# Patient Record
Sex: FEMALE | Race: ASIAN | URBAN - METROPOLITAN AREA
[De-identification: names, ages, dates, MRNs, and addresses within clinical notes are randomized per-mention and may not be internally consistent; named-entity substitution may affect disease eponyms.]

---

## 2019-12-12 ENCOUNTER — OFFICE VISIT (OUTPATIENT)
Dept: FAMILY MEDICINE CLINIC | Facility: CLINIC | Age: 34
End: 2019-12-12
Payer: COMMERCIAL

## 2019-12-12 VITALS
HEART RATE: 104 BPM | HEIGHT: 59 IN | BODY MASS INDEX: 24.87 KG/M2 | TEMPERATURE: 99 F | SYSTOLIC BLOOD PRESSURE: 100 MMHG | RESPIRATION RATE: 20 BRPM | DIASTOLIC BLOOD PRESSURE: 58 MMHG | OXYGEN SATURATION: 98 % | WEIGHT: 123.38 LBS

## 2019-12-12 DIAGNOSIS — R05.9 COUGH: ICD-10-CM

## 2019-12-12 DIAGNOSIS — B34.9 ACUTE VIRAL SYNDROME: Primary | ICD-10-CM

## 2019-12-12 DIAGNOSIS — R11.2 NAUSEA AND VOMITING, INTRACTABILITY OF VOMITING NOT SPECIFIED, UNSPECIFIED VOMITING TYPE: ICD-10-CM

## 2019-12-12 DIAGNOSIS — J02.9 SORE THROAT: ICD-10-CM

## 2019-12-12 PROCEDURE — 99203 OFFICE O/P NEW LOW 30 MIN: CPT | Performed by: FAMILY MEDICINE

## 2019-12-12 PROCEDURE — 87880 STREP A ASSAY W/OPTIC: CPT | Performed by: FAMILY MEDICINE

## 2019-12-12 PROCEDURE — 87081 CULTURE SCREEN ONLY: CPT | Performed by: FAMILY MEDICINE

## 2019-12-12 RX ORDER — ONDANSETRON 4 MG/1
4 TABLET, ORALLY DISINTEGRATING ORAL EVERY 8 HOURS PRN
Qty: 10 TABLET | Refills: 0 | Status: SHIPPED | OUTPATIENT
Start: 2019-12-12

## 2019-12-12 RX ORDER — NORGESTIMATE AND ETHINYL ESTRADIOL
KIT
Refills: 2 | COMMUNITY
Start: 2019-10-31

## 2019-12-12 NOTE — PROGRESS NOTES
CHIEF COMPLAINT: Patient presents with:  URI: x 3 days   Headache: cough and vomiting   Fever        HPI:     Sirisha Bundy is a 37year old female presents for cold symptoms with fever and fatigue.     Medina Alpesh is a previously healthy 38 yo F p/w cold symptoms Musculoskeletal: Positive for myalgias. Neurological: Positive for headaches. Pertinent positives and negatives noted in the the HPI.     PHYSICAL EXAM:   /58 (BP Location: Right arm, Patient Position: Sitting, Cuff Size: adult)   Pulse 104 cough medications, cough drops, rest, fluids, tea with honey. If sx worsen or persist, advised to f-u with PCP. 2. Cough  Pt declined benzonatate Rx. Supportive care discussed.     3. Sore throat  Rapid Strep: neg, send for Cx.    - STREP A ASSAY W/OPT

## 2019-12-12 NOTE — PATIENT INSTRUCTIONS
As of October 6th 2014, the Drug Enforcement Agency [facility]) is reclassifying all hydrocodone combination medications from Schedule III to Schedule II. This includes medications such as Norco, Vicodin, Lortab, Zohydro, and Vicoprofen.      What this means for symptoms depend on the part of the body that the virus affects. If it settles in your nose, throat, and lungs, it may cause cough, sore throat, congestion, runny nose, headache, earache and other ear symptoms, or shortness of breath.  If it settles in your disease, drinking too much fluid can cause it build up in the your body and be dangerous to your health.   · Over-the-counter remedies won't shorten the length of the illness but may be helpful for symptoms such as cough, sore throat, nasal and sinus conges

## 2025-04-30 ENCOUNTER — HOSPITAL ENCOUNTER (OUTPATIENT)
Dept: CT IMAGING | Facility: HOSPITAL | Age: 40
Discharge: HOME | End: 2025-04-30
Attending: INTERNAL MEDICINE
Payer: COMMERCIAL

## 2025-04-30 ENCOUNTER — LAB ENCOUNTER (OUTPATIENT)
Dept: LAB | Facility: HOSPITAL | Age: 40
End: 2025-04-30
Attending: INTERNAL MEDICINE
Payer: COMMERCIAL

## 2025-04-30 DIAGNOSIS — R10.9 PAIN IN THE ABDOMEN: Primary | ICD-10-CM

## 2025-04-30 DIAGNOSIS — R10.9 ABDOMINAL PAIN: ICD-10-CM

## 2025-04-30 LAB
ALBUMIN SERPL-MCNC: 4.7 G/DL (ref 3.2–4.8)
ALBUMIN/GLOB SERPL: 1.5 {RATIO} (ref 1–2)
ALP LIVER SERPL-CCNC: 48 U/L (ref 37–98)
ALT SERPL-CCNC: 9 U/L (ref 10–49)
ANION GAP SERPL CALC-SCNC: 4 MMOL/L (ref 0–18)
AST SERPL-CCNC: 14 U/L (ref ?–34)
BILIRUB SERPL-MCNC: 0.4 MG/DL (ref 0.3–1.2)
BUN BLD-MCNC: 9 MG/DL (ref 9–23)
CALCIUM BLD-MCNC: 9.9 MG/DL (ref 8.7–10.6)
CHLORIDE SERPL-SCNC: 105 MMOL/L (ref 98–112)
CO2 SERPL-SCNC: 28 MMOL/L (ref 21–32)
CREAT BLD-MCNC: 0.7 MG/DL (ref 0.55–1.02)
CREAT BLD-MCNC: 0.7 MG/DL (ref 0.55–1.02)
EGFRCR SERPLBLD CKD-EPI 2021: 111 ML/MIN/1.73M2 (ref 60–?)
EGFRCR SERPLBLD CKD-EPI 2021: 111 ML/MIN/1.73M2 (ref 60–?)
FASTING STATUS PATIENT QL REPORTED: YES
GLOBULIN PLAS-MCNC: 3.1 G/DL (ref 2–3.5)
GLUCOSE BLD-MCNC: 90 MG/DL (ref 70–99)
OSMOLALITY SERPL CALC.SUM OF ELEC: 282 MOSM/KG (ref 275–295)
POTASSIUM SERPL-SCNC: 3.9 MMOL/L (ref 3.5–5.1)
PROT SERPL-MCNC: 7.8 G/DL (ref 5.7–8.2)
SODIUM SERPL-SCNC: 137 MMOL/L (ref 136–145)

## 2025-04-30 PROCEDURE — 82565 ASSAY OF CREATININE: CPT

## 2025-04-30 PROCEDURE — 80053 COMPREHEN METABOLIC PANEL: CPT

## 2025-04-30 PROCEDURE — 36415 COLL VENOUS BLD VENIPUNCTURE: CPT

## 2025-04-30 PROCEDURE — 74177 CT ABD & PELVIS W/CONTRAST: CPT | Performed by: INTERNAL MEDICINE
